# Patient Record
Sex: MALE | Race: WHITE | NOT HISPANIC OR LATINO | Employment: OTHER | URBAN - METROPOLITAN AREA
[De-identification: names, ages, dates, MRNs, and addresses within clinical notes are randomized per-mention and may not be internally consistent; named-entity substitution may affect disease eponyms.]

---

## 2024-08-30 ENCOUNTER — APPOINTMENT (OUTPATIENT)
Dept: RADIOLOGY | Facility: MEDICAL CENTER | Age: 69
End: 2024-08-30
Attending: EMERGENCY MEDICINE
Payer: MEDICARE

## 2024-08-30 ENCOUNTER — HOSPITAL ENCOUNTER (EMERGENCY)
Facility: MEDICAL CENTER | Age: 69
End: 2024-08-30
Attending: EMERGENCY MEDICINE
Payer: MEDICARE

## 2024-08-30 VITALS
BODY MASS INDEX: 28.63 KG/M2 | TEMPERATURE: 99.6 F | DIASTOLIC BLOOD PRESSURE: 74 MMHG | RESPIRATION RATE: 18 BRPM | HEART RATE: 77 BPM | WEIGHT: 200 LBS | HEIGHT: 70 IN | OXYGEN SATURATION: 96 % | SYSTOLIC BLOOD PRESSURE: 162 MMHG

## 2024-08-30 DIAGNOSIS — S01.511A LIP LACERATION, INITIAL ENCOUNTER: ICD-10-CM

## 2024-08-30 DIAGNOSIS — S61.213A LACERATION OF LEFT MIDDLE FINGER WITHOUT FOREIGN BODY WITHOUT DAMAGE TO NAIL, INITIAL ENCOUNTER: ICD-10-CM

## 2024-08-30 DIAGNOSIS — S03.2XXA TOOTH AVULSION, INITIAL ENCOUNTER: ICD-10-CM

## 2024-08-30 DIAGNOSIS — S00.81XA ABRASION OF FOREHEAD, INITIAL ENCOUNTER: ICD-10-CM

## 2024-08-30 PROCEDURE — A9270 NON-COVERED ITEM OR SERVICE: HCPCS | Performed by: EMERGENCY MEDICINE

## 2024-08-30 PROCEDURE — 70355 PANORAMIC X-RAY OF JAWS: CPT

## 2024-08-30 PROCEDURE — 304999 HCHG REPAIR-SIMPLE/INTERMED LEVEL 1

## 2024-08-30 PROCEDURE — 99284 EMERGENCY DEPT VISIT MOD MDM: CPT

## 2024-08-30 PROCEDURE — 303747 HCHG EXTRA SUTURE

## 2024-08-30 PROCEDURE — 700101 HCHG RX REV CODE 250: Performed by: EMERGENCY MEDICINE

## 2024-08-30 PROCEDURE — 303353 HCHG DERMABOND SKIN ADHESIVE

## 2024-08-30 PROCEDURE — 41899 UNLISTED PX DENTALVLR STRUX: CPT

## 2024-08-30 PROCEDURE — 700102 HCHG RX REV CODE 250 W/ 637 OVERRIDE(OP): Performed by: EMERGENCY MEDICINE

## 2024-08-30 PROCEDURE — 305308 HCHG STAPLER,SKIN,DISP.

## 2024-08-30 RX ORDER — OXYCODONE AND ACETAMINOPHEN 5; 325 MG/1; MG/1
1 TABLET ORAL ONCE
Status: COMPLETED | OUTPATIENT
Start: 2024-08-30 | End: 2024-08-30

## 2024-08-30 RX ORDER — LIDOCAINE HYDROCHLORIDE 20 MG/ML
10 INJECTION, SOLUTION INFILTRATION; PERINEURAL ONCE
Status: COMPLETED | OUTPATIENT
Start: 2024-08-30 | End: 2024-08-30

## 2024-08-30 RX ADMIN — OXYCODONE HYDROCHLORIDE AND ACETAMINOPHEN 1 TABLET: 5; 325 TABLET ORAL at 15:32

## 2024-08-30 RX ADMIN — LIDOCAINE HYDROCHLORIDE 10 ML: 20 INJECTION, SOLUTION INFILTRATION; PERINEURAL at 13:45

## 2024-08-30 ASSESSMENT — PAIN DESCRIPTION - PAIN TYPE: TYPE: ACUTE PAIN

## 2024-08-30 NOTE — ED NOTES
Pt discharged to home. Pt was given follow up instructions. Pt verbalized understanding of all instructions for discharge and is wheeled out of ED with friend. AOx4

## 2024-08-30 NOTE — ED TRIAGE NOTES
"Chief Complaint   Patient presents with    Fall Less than 10 Feet     Pt presents after a fall down an escalator at the [facility]. +HS, -thinners, -LOC. Pt has lacerations and abrasions on head, stomach, and left middle finger. Pt also has a broken tooth and lower lip laceration.            BP (!) 166/81   Pulse 98   Temp 37.6 °C (99.7 °F) (Temporal)   Resp 16   Ht 1.778 m (5' 10\")   Wt 90.7 kg (200 lb)   SpO2 97%   BMI 28.70 kg/m²     "

## 2024-08-30 NOTE — ED PROVIDER NOTES
"  ER Provider Note    Scribed for Kathleen Craig M.d. by Ender Bond. 8/30/2024  12:37 PM    Primary Care Provider: None noted    CHIEF COMPLAINT  Chief Complaint   Patient presents with    Fall Less than 10 Feet     Pt presents after a fall down an escalator at the [facility]. +HS, -thinners, -LOC. Pt has lacerations and abrasions on head, stomach, and left middle finger. Pt also has a broken tooth and lower lip laceration.      LIMITATION TO HISTORY   Select: : None    HPI/ROS    OUTSIDE HISTORIAN(S):  Family at bedside  EXTERNAL RECORDS REVIEWED  None available    Julio Gresham is a 69 y.o. male who presents to the ED for head injury onset earlier today. He states that about 20 minutes ago he was going down the escalator when he \"fell and then got down much faster\". He denies any loss of consciousness. He does not think he takes any blood thinners. He denies any current headache, neck pain, nausea or vomiting. He is up to date on tetanus.    PAST MEDICAL HISTORY  Past Medical History:   Diagnosis Date    CAD (coronary artery disease)        SURGICAL HISTORY  History reviewed. No pertinent surgical history.    FAMILY HISTORY  History reviewed. No pertinent family history.    SOCIAL HISTORY   reports that he has never smoked. He has never used smokeless tobacco. He reports current alcohol use. He reports that he does not currently use drugs.    CURRENT MEDICATIONS  There are no discharge medications for this patient.      ALLERGIES  Patient has no allergy information on record.    PHYSICAL EXAM  BP (!) 166/81   Pulse 98   Temp 37.6 °C (99.7 °F) (Temporal)   Resp 16   SpO2 97%   Constitutional: Alert in no apparent distress.  HENT: Bilateral external ears normal, Nose normal. Intraoral:   Cardiovascular: Regular rate and rhythm, no murmurs.   Thorax & Lungs: No respiratory distress, superficial abrasions in linear fashion over his chest  Skin: Warm, Dry, No erythema, No rash. Multiple linear " abrasions to forehead and lips  Musculoskeletal:  No major deformities noted.  Neurologic: Alert, moving all extremities without difficulty, no focal deficits.     DIAGNOSTIC STUDIES & PROCEDURES    Radiology:   The attending Emergency Physician has independently interpreted the diagnostic imaging associated with this visit and is awaiting the final reading from the radiologist, which will be displayed below.  Preliminary interpretation is a follows: No obvious mandibular fracture  Radiologist interpretation:   [id number]   Final Result      Normal mandible series.   Suboptimal evaluation of the maxillary and mandibular dentition.         Laceration Repair Procedure Note  Indication: Laceration  Procedure: The patient was placed in the appropriate position and anesthesia around the laceration was obtained by infiltration using 2% Lidocaine without epinephrine. The wound was minimally contaminated .The area was then irrigated with normal saline. The laceration was closed with 5-0 fast absorbing gut using interrupted sutures, one placed to the vermilion border, one below it. A second laceration was closed with Dermabond. The wound area was then dressed with a sterile dressing.    Total repaired wound length: 1 cm.   Other Items: Suture count: 2  The patient tolerated the procedure well.  Complications: None     Dental Block and Tooth Removal:  3:19 PM performed by Dr Craig  Sterile process was used  Indication: dental pain  Consetnt: verbal from the patient  Location: right lower incisor  Anesthesia: 2% lido w/o epi, 3 cc's administered to periosteal region  Toleration: the patient tolerated well, no immediate complications or bleeding  Total procedure time: 7 minutes     COURSE & MEDICAL DECISION MAKING    ED Observation Status? No; Patient does not meet criteria for ED Observation.     12:37 PM - Patient seen and evaluated at bedside. Discussed plan of care including laceration repair if indicated and  DX-mandible panoramic. Will consult with oral surgery. He understands and agrees to the plan of care.     12:45 PM I discussed the patient's case and the above findings with Dr. Flores (Oral Surgery) who recommends panoramic mandible x-ray, removal of tooth and follow up in office.     3:20 PM - Laceration repair performed, dental block and removal also performed, see above notes. Patient will now be discharged at this time. Discussed return precautions and plan for at home care. Patient verbalizes understanding and agreement to this plan of care.       INITIAL ASSESSMENT AND PLAN  Care Narrative: This is a 69-year-old gentleman that presents for evaluation of injury sustained from an escalator.  Patient is not on thinners.  He had superficial abrasions to his forehead his face and his chest.  Patient was alert oriented.  He was observed in the emergency department for multiple hours he had no severe headache no vomiting therefore I did not pursue imaging I feel the chance of intracranial bleed is extremely low given lack of symptoms.  The abrasions on his forehead are deeper abrasions still superficial nature and are not amenable to sutures.  The laceration on his lip was as it involve the vermilion border and therefore this was repaired as indicated above.  I spoke with oral surgery about his very loose tooth who advised me to pull it out with a subperiosteal block.  This was performed without difficulty.  Patient says that this tooth was planning on coming out anyway because it was moving inside and at an odd angle.  Patient will be discharged his tetanus is up-to-date.  He is agreeable to this.               DISPOSITION AND DISCUSSIONS  I have discussed management of the patient with the following physicians and JAXON's: Dr. Flores (Oral Surgery)    Discussion of management with other [facility] or appropriate source(s): None     Escalation of care considered, and ultimately not performed: diagnostic imaging.    Barriers  to care at this time, including but not limited to: None    Decision tools and prescription drugs considered including, but not limited to:  pain medications recommended OTC meds .    DISPOSITION:  Patient will be discharged home in stable condition.    FOLLOW UP:  [facility], Emergency Dept  [address]  [place] [id number] [phone]    Return to the emergency department for worsening pain, redness swelling or other concerns.    FINAL IMPRESSION   1. Tooth avulsion, initial encounter    2. Lip laceration, initial encounter    3. Abrasion of forehead, initial encounter    4. Laceration of left middle finger without foreign body without damage to nail, initial encounter      Ender MCGILL (Scribe), am scribing for, and in the presence of, Kathleen Craig M.D..    Electronically signed by: Ender Bond (Scribisiah), 8/30/2024    IKathleen M.D. personally performed the services described in this documentation, as scribed by Ender Bond in my presence, and it is both accurate and complete.    The note accurately reflects work and decisions made by me.  Kathleen Craig M.D.  8/30/2024  6:49 PM